# Patient Record
Sex: MALE | Race: BLACK OR AFRICAN AMERICAN | NOT HISPANIC OR LATINO | Employment: UNEMPLOYED | URBAN - METROPOLITAN AREA
[De-identification: names, ages, dates, MRNs, and addresses within clinical notes are randomized per-mention and may not be internally consistent; named-entity substitution may affect disease eponyms.]

---

## 2018-01-06 ENCOUNTER — HOSPITAL ENCOUNTER (EMERGENCY)
Facility: OTHER | Age: 28
Discharge: HOME | End: 2018-01-06
Attending: EMERGENCY MEDICINE
Payer: MEDICAID

## 2018-01-06 VITALS
TEMPERATURE: 97 F | HEIGHT: 69 IN | RESPIRATION RATE: 18 BRPM | WEIGHT: 220 LBS | SYSTOLIC BLOOD PRESSURE: 131 MMHG | BODY MASS INDEX: 32.58 KG/M2 | DIASTOLIC BLOOD PRESSURE: 77 MMHG | HEART RATE: 68 BPM | OXYGEN SATURATION: 99 %

## 2018-01-06 DIAGNOSIS — M54.50 ACUTE BILATERAL LOW BACK PAIN WITHOUT SCIATICA: Primary | ICD-10-CM

## 2018-01-06 PROCEDURE — 99283 EMERGENCY DEPT VISIT LOW MDM: CPT

## 2018-01-06 RX ORDER — IBUPROFEN 600 MG/1
600 TABLET ORAL EVERY 6 HOURS PRN
Qty: 20 TABLET | Refills: 0 | Status: SHIPPED | OUTPATIENT
Start: 2018-01-06 | End: 2020-01-31 | Stop reason: ALTCHOICE

## 2018-01-06 RX ORDER — METHOCARBAMOL 500 MG/1
1000 TABLET, FILM COATED ORAL 3 TIMES DAILY PRN
Qty: 30 TABLET | Refills: 0 | Status: SHIPPED | OUTPATIENT
Start: 2018-01-06 | End: 2018-01-11

## 2018-01-06 RX ORDER — IBUPROFEN 400 MG/1
800 TABLET ORAL
Status: DISCONTINUED | OUTPATIENT
Start: 2018-01-06 | End: 2018-01-06 | Stop reason: HOSPADM

## 2018-01-06 NOTE — ED PROVIDER NOTES
"Encounter Date: 1/6/2018    SCRIBE #1 NOTE: I, Flo Osorio, am scribing for, and in the presence of, Dr. Lozoya.       History     Chief Complaint   Patient presents with    Back Pain     pt c/o 8/10 "thumping" back pain since apx 1400 yesterday, pt denies taking any OTC medications to alleviate symptoms.      3:56 AM    Patient is a 27 y.o. male who presents to the ED with complaint of lower back pain. He reports gradual onset of pain approximately two hours ago. He indicates that the pain is located in the middle of the low back. Pain is intermittent, described as "sharp," and is rated 6/10 currently and 8/10 at its worst. He notes the pain is improved with certain positional changes. 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 He denies taking any OTC medication for current symptoms. She denies experiencing similar pain in the past. He denies bowel or bladder incontinence, nausea, vomiting, diarrhea, constipation, fever, chills, cough, congestion, sore throat, or runny nose. He reports no major medical problems, daily medications, known allergies, or past surgeries. He denies use of tobacco, alcohol, or illicit drugs. He has no additional complaints.      The history is provided by the patient.     Review of patient's allergies indicates:  No Known Allergies  History reviewed. No pertinent past medical history.  History reviewed. No pertinent surgical history.  History reviewed. No pertinent family history.  Social History   Substance Use Topics    Smoking status: Never Smoker    Smokeless tobacco: Never Used    Alcohol use No     Review of Systems   Constitutional: Negative for chills and fever.   HENT: Negative for congestion and sore throat.    Eyes: Negative for visual disturbance.   Respiratory: Negative for cough and shortness of breath.    Cardiovascular: Negative for chest pain and palpitations.   Gastrointestinal: Negative for abdominal pain, diarrhea and " vomiting.        Negative for bowel incontinence.   Genitourinary: Negative for decreased urine volume, dysuria and frequency.        Negative for bladder incontinence.   Musculoskeletal: Positive for back pain (mid-lower back). Negative for joint swelling, neck pain and neck stiffness.   Skin: Negative for rash and wound.   Neurological: Negative for weakness, numbness and headaches.   Psychiatric/Behavioral: Negative for behavioral problems and confusion.       Physical Exam     Initial Vitals [01/06/18 0234]   BP Pulse Resp Temp SpO2   119/64 72 18 97.3 °F (36.3 °C) 96 %      MAP       82.33         Physical Exam    Nursing note and vitals reviewed.  Constitutional: He appears well-developed and well-nourished. No distress.   HENT:   Head: Normocephalic and atraumatic.   Eyes: Conjunctivae and EOM are normal.   Neck: Normal range of motion. Neck supple.   Cardiovascular: Normal rate, regular rhythm and normal heart sounds. Exam reveals no gallop and no friction rub.    No murmur heard.  Pulmonary/Chest: Breath sounds normal. No respiratory distress. He has no wheezes. He has no rhonchi. He has no rales.   Abdominal: Soft. There is no tenderness.   Musculoskeletal: Normal range of motion. He exhibits tenderness (midline tenderness of lower lumbar spine and sacrum with bilateral lumbar paraspinal tenderness to palpation).   Neurological: He is alert and oriented to person, place, and time. He has normal strength. No cranial nerve deficit or sensory deficit.   Skin: Skin is warm and dry.   Psychiatric: He has a normal mood and affect. His behavior is normal.         ED Course   Procedures  Labs Reviewed - No data to display          Medical Decision Making:   ED Management:  Emergent evaluation of 27-year-old male with complaint of low back pain, no direct trauma.  On exam there is no evidence of cauda equina syndrome or cord compression.  I do not think emergent imaging is indicated given lack of neuro symptoms or  trauma.  He was treated with ibuprofen and is discharged in good condition with prescriptions for ibuprofen and Robaxin.  He is advised close follow-up with PCP or return here for any new or worsening symptoms.            Scribe Attestation:   Scribe #1: I performed the above scribed service and the documentation accurately describes the services I performed. I attest to the accuracy of the note.    Attending Attestation:           Physician Attestation for Scribe:  Physician Attestation Statement for Scribe #1: I, Dr. Lozoya, reviewed documentation, as scribed by Flo Osorio in my presence, and it is both accurate and complete.                 ED Course      Clinical Impression:     1. Acute bilateral low back pain without sciatica                                 Taylor Lozoya MD  01/06/18 0612

## 2018-01-06 NOTE — ED NOTES
"Pt presents to the ED with c/o back pain. Pt describes the pain as "thumping" in nature. Pt reports the pain began around 2pm when he was leaving work. Pt denies any injury. Pt reports the pain is from the middle to lower back. Pt denies taking anything to attempt to alleviate the pain. Pt appears non toxic and in no signs of acute distress.   "

## 2020-01-31 ENCOUNTER — HOSPITAL ENCOUNTER (EMERGENCY)
Facility: OTHER | Age: 30
Discharge: HOME | End: 2020-01-31
Attending: EMERGENCY MEDICINE
Payer: MEDICAID

## 2020-01-31 VITALS
HEIGHT: 69 IN | OXYGEN SATURATION: 100 % | RESPIRATION RATE: 18 BRPM | WEIGHT: 210 LBS | HEART RATE: 84 BPM | SYSTOLIC BLOOD PRESSURE: 118 MMHG | DIASTOLIC BLOOD PRESSURE: 74 MMHG | TEMPERATURE: 98 F | BODY MASS INDEX: 31.1 KG/M2

## 2020-01-31 DIAGNOSIS — N34.2 URETHRITIS: Primary | ICD-10-CM

## 2020-01-31 LAB
BACTERIA #/AREA URNS HPF: ABNORMAL /HPF
BILIRUB UR QL STRIP: NEGATIVE
CLARITY UR: CLEAR
COLOR UR: YELLOW
GLUCOSE UR QL STRIP: NEGATIVE
HGB UR QL STRIP: NEGATIVE
HYALINE CASTS #/AREA URNS LPF: 0 /LPF
KETONES UR QL STRIP: NEGATIVE
LEUKOCYTE ESTERASE UR QL STRIP: ABNORMAL
MICROSCOPIC COMMENT: ABNORMAL
NITRITE UR QL STRIP: NEGATIVE
NON-SQ EPI CELLS #/AREA URNS HPF: 0 /HPF
PH UR STRIP: 6 [PH] (ref 5–8)
PROT UR QL STRIP: NEGATIVE
RBC #/AREA URNS HPF: 0 /HPF (ref 0–4)
SP GR UR STRIP: 1.02 (ref 1–1.03)
SQUAMOUS #/AREA URNS HPF: 2 /HPF
URN SPEC COLLECT METH UR: ABNORMAL
UROBILINOGEN UR STRIP-ACNC: NEGATIVE EU/DL
WBC #/AREA URNS HPF: 20 /HPF (ref 0–5)
WBC CLUMPS URNS QL MICRO: ABNORMAL
YEAST URNS QL MICRO: ABNORMAL

## 2020-01-31 PROCEDURE — 99284 EMERGENCY DEPT VISIT MOD MDM: CPT | Mod: 25

## 2020-01-31 PROCEDURE — 81000 URINALYSIS NONAUTO W/SCOPE: CPT

## 2020-01-31 PROCEDURE — 96372 THER/PROPH/DIAG INJ SC/IM: CPT

## 2020-01-31 PROCEDURE — 87086 URINE CULTURE/COLONY COUNT: CPT

## 2020-01-31 PROCEDURE — 87491 CHLMYD TRACH DNA AMP PROBE: CPT

## 2020-01-31 PROCEDURE — 63600175 PHARM REV CODE 636 W HCPCS: Performed by: EMERGENCY MEDICINE

## 2020-01-31 PROCEDURE — 25000003 PHARM REV CODE 250: Performed by: EMERGENCY MEDICINE

## 2020-01-31 RX ORDER — CEFTRIAXONE 250 MG/1
250 INJECTION, POWDER, FOR SOLUTION INTRAMUSCULAR; INTRAVENOUS
Status: COMPLETED | OUTPATIENT
Start: 2020-01-31 | End: 2020-01-31

## 2020-01-31 RX ORDER — AZITHROMYCIN 250 MG/1
1000 TABLET, FILM COATED ORAL
Status: COMPLETED | OUTPATIENT
Start: 2020-01-31 | End: 2020-01-31

## 2020-01-31 RX ADMIN — AZITHROMYCIN 1000 MG: 250 TABLET, FILM COATED ORAL at 10:01

## 2020-01-31 RX ADMIN — CEFTRIAXONE SODIUM 250 MG: 250 INJECTION, POWDER, FOR SOLUTION INTRAMUSCULAR; INTRAVENOUS at 10:01

## 2020-01-31 NOTE — ED PROVIDER NOTES
Encounter Date: 1/31/2020    SCRIBE #1 NOTE: IMilad, am scribing for, and in the presence of, Dr. Evans.       History     Chief Complaint   Patient presents with    Dysuria     Pt c/o penile discharge & burning upon urination which started yesterday. Pt reports unprotected intercourse.    Penile Discharge     Time seen by provider: 10:36 AM    This is a 29 y.o. male who presents with complaint of penile discharge that began yesterday. The patient reports that he is also experiencing dysuria. He admits to being sexually active with one person for a few years with intermittent use of protection. He denies fever, sore throat, chest pain, shortness of breath, nausea, vomiting, and penile lesions.     The history is provided by the patient.     Review of patient's allergies indicates:  No Known Allergies  History reviewed. No pertinent past medical history.  History reviewed. No pertinent surgical history.  History reviewed. No pertinent family history.  Social History     Tobacco Use    Smoking status: Current Every Day Smoker    Smokeless tobacco: Never Used    Tobacco comment: pt smokes  marijuana daily   Substance Use Topics    Alcohol use: Yes     Comment: rarely    Drug use: Yes     Types: Marijuana     Review of Systems   Constitutional: Negative for fever.   HENT: Negative for sore throat.    Eyes: Negative for redness.   Respiratory: Negative for shortness of breath.    Cardiovascular: Negative for chest pain.   Gastrointestinal: Negative for abdominal pain.   Genitourinary: Positive for discharge and dysuria.        Negative for penile lesions.    Skin: Negative for rash.   Neurological: Negative for headaches.   Psychiatric/Behavioral: Negative for confusion.       Physical Exam     Initial Vitals [01/31/20 0917]   BP Pulse Resp Temp SpO2   119/72 86 18 97.8 °F (36.6 °C) 100 %      MAP       --         Physical Exam    Nursing note and vitals reviewed.  Constitutional: He appears  well-developed and well-nourished. He is not diaphoretic. No distress.   HENT:   Head: Normocephalic and atraumatic.   Mouth/Throat: Oropharynx is clear and moist.   Eyes: Conjunctivae and EOM are normal. Pupils are equal, round, and reactive to light.   Neck: Normal range of motion. Neck supple.   Cardiovascular: Normal rate, regular rhythm, normal heart sounds and normal pulses.   No murmur heard.  Pulmonary/Chest: Breath sounds normal. No respiratory distress. He has no wheezes. He has no rhonchi. He has no rales.   Abdominal: Soft. There is no tenderness. There is no rebound and no guarding.   Genitourinary: Testes normal and penis normal. Right testis shows no tenderness. Left testis shows no tenderness. No discharge found.   Genitourinary Comments: No rashes.    Musculoskeletal: Normal range of motion. He exhibits no edema or tenderness.   Neurological: He is alert and oriented to person, place, and time. He has normal strength. No cranial nerve deficit.   Skin: Skin is warm and dry.   Psychiatric: He has a normal mood and affect. His behavior is normal. Thought content normal.         ED Course   Procedures  Labs Reviewed   C. TRACHOMATIS/N. GONORRHOEAE BY AMP DNA - Abnormal; Notable for the following components:       Result Value    N gonorrhoeae, amplified DNA Detected (*)     All other components within normal limits    Narrative:     Sources by Resulting Lab:->[facility]   URINALYSIS, REFLEX TO URINE CULTURE - Abnormal; Notable for the following components:    Leukocytes, UA Trace (*)     All other components within normal limits    Narrative:     Preferred Collection Type->Urine, Clean Catch   URINALYSIS MICROSCOPIC - Abnormal; Notable for the following components:    WBC, UA 20 (*)     Bacteria Few (*)     All other components within normal limits    Narrative:     Preferred Collection Type->Urine, Clean Catch   CULTURE, URINE    Narrative:     Preferred Collection Type->Urine, Clean Catch           Imaging Results    None          Medical Decision Making:   History:   Old Medical Records: I decided to obtain old medical records.  Initial Assessment:       29-year-old male presents with penis discharge since yesterday.  He reports using protection occasionally with 1 partner.  No other associated symptoms such as testicle pain, rash, or other new complaints.   Exam with no concerning findings, no evidence of rash or ulcers to suggest HSV or syphilis, and no testicular tenderness to suggest epididymitis/orchitis.  UA with 20 WBC; given patient's age and risk factors, this is much more likely from STD than UTI.   Patient treated empirically for GC/chlamydia with ceftriaxone/Zithromax; we will call him for any positive results of these cultures and he will follow up with PCP or [facility] for full testing.  He can return to the ED for any worsening symptoms or other concerns.       Clinical Tests:   Lab Tests: Ordered and Reviewed            Scribe Attestation:   Scribe #1: I performed the above scribed service and the documentation accurately describes the services I performed. I attest to the accuracy of the note.    Attending Attestation:           Physician Attestation for Scribe:  Physician Attestation Statement for Scribe #1: I, Dr. Evans, reviewed documentation, as scribed by Milad Hurley  in my presence, and it is both accurate and complete.                               Clinical Impression:     1. Urethritis                              Jamie Evans MD  02/03/20 6137

## 2020-01-31 NOTE — ED NOTES
Patient Identifiers for Anirudh Stinson checked and correct  LOC: The patient is awake, alert and aware of environment with an appropriate affect, the patient is oriented x 3 and speaking appropriate.  APPEARANCE: Patient resting comfortably and in no acute distress. The patient is clean and well groomed. The patient's clothing is properly fastened.  SKIN: The skin is warm and dry. The patient has normal skin turgor and moist mucus membranes. No rashes or lesions upon observation. Skin Intact , no breakdown noted.  Musculoskeletal :  Normal range of motion noted. Moves all extremities well.  RESPIRATORY: Airway is open and patent, respirations are spontaneous, patient has a normal effort and rate.   ABDOMEN: Soft and non tender to palpation, no distention observed. Bowels Sounds are WNL all quads.  PULSES: 2+ radial pulses, symmetrical.  Will continue to monitor

## 2020-02-02 LAB — BACTERIA UR CULT: NO GROWTH

## 2020-02-03 LAB
C TRACH DNA SPEC QL NAA+PROBE: NOT DETECTED
N GONORRHOEA DNA SPEC QL NAA+PROBE: DETECTED

## 2022-07-02 ENCOUNTER — HOSPITAL ENCOUNTER (EMERGENCY)
Facility: HOSPITAL | Age: 32
Discharge: HOME | End: 2022-07-02
Attending: EMERGENCY MEDICINE
Payer: MEDICAID

## 2022-07-02 VITALS
TEMPERATURE: 99 F | WEIGHT: 200 LBS | OXYGEN SATURATION: 99 % | DIASTOLIC BLOOD PRESSURE: 89 MMHG | BODY MASS INDEX: 29.53 KG/M2 | HEART RATE: 68 BPM | SYSTOLIC BLOOD PRESSURE: 127 MMHG | RESPIRATION RATE: 16 BRPM

## 2022-07-02 DIAGNOSIS — M79.10 MUSCULAR PAIN: Primary | ICD-10-CM

## 2022-07-02 DIAGNOSIS — R07.9 CHEST PAIN: ICD-10-CM

## 2022-07-02 LAB
ALBUMIN SERPL BCP-MCNC: 4 G/DL (ref 3.5–5.2)
ALP SERPL-CCNC: 68 U/L (ref 55–135)
ALT SERPL W/O P-5'-P-CCNC: 11 U/L (ref 10–44)
ANION GAP SERPL CALC-SCNC: 8 MMOL/L (ref 8–16)
AST SERPL-CCNC: 11 U/L (ref 10–40)
BASOPHILS # BLD AUTO: 0.05 K/UL (ref 0–0.2)
BASOPHILS NFR BLD: 0.6 % (ref 0–1.9)
BILIRUB SERPL-MCNC: 0.3 MG/DL (ref 0.1–1)
BILIRUB UR QL STRIP: NEGATIVE
BUN SERPL-MCNC: 8 MG/DL (ref 6–20)
CALCIUM SERPL-MCNC: 9 MG/DL (ref 8.7–10.5)
CHLORIDE SERPL-SCNC: 109 MMOL/L (ref 95–110)
CLARITY UR: CLEAR
CO2 SERPL-SCNC: 25 MMOL/L (ref 23–29)
COLOR UR: YELLOW
CREAT SERPL-MCNC: 1.1 MG/DL (ref 0.5–1.4)
DIFFERENTIAL METHOD: NORMAL
EOSINOPHIL # BLD AUTO: 0.1 K/UL (ref 0–0.5)
EOSINOPHIL NFR BLD: 1.4 % (ref 0–8)
ERYTHROCYTE [DISTWIDTH] IN BLOOD BY AUTOMATED COUNT: 13.4 % (ref 11.5–14.5)
EST. GFR  (AFRICAN AMERICAN): >60 ML/MIN/1.73 M^2
EST. GFR  (NON AFRICAN AMERICAN): >60 ML/MIN/1.73 M^2
GLUCOSE SERPL-MCNC: 85 MG/DL (ref 70–110)
GLUCOSE UR QL STRIP: NEGATIVE
HCT VFR BLD AUTO: 43.5 % (ref 40–54)
HGB BLD-MCNC: 14.3 G/DL (ref 14–18)
HGB UR QL STRIP: NEGATIVE
IMM GRANULOCYTES # BLD AUTO: 0.01 K/UL (ref 0–0.04)
IMM GRANULOCYTES NFR BLD AUTO: 0.1 % (ref 0–0.5)
KETONES UR QL STRIP: NEGATIVE
LEUKOCYTE ESTERASE UR QL STRIP: NEGATIVE
LYMPHOCYTES # BLD AUTO: 2.7 K/UL (ref 1–4.8)
LYMPHOCYTES NFR BLD: 32.4 % (ref 18–48)
MCH RBC QN AUTO: 30.2 PG (ref 27–31)
MCHC RBC AUTO-ENTMCNC: 32.9 G/DL (ref 32–36)
MCV RBC AUTO: 92 FL (ref 82–98)
MONOCYTES # BLD AUTO: 0.7 K/UL (ref 0.3–1)
MONOCYTES NFR BLD: 8.3 % (ref 4–15)
NEUTROPHILS # BLD AUTO: 4.8 K/UL (ref 1.8–7.7)
NEUTROPHILS NFR BLD: 57.2 % (ref 38–73)
NITRITE UR QL STRIP: NEGATIVE
NRBC BLD-RTO: 0 /100 WBC
PH UR STRIP: 8 [PH] (ref 5–8)
PLATELET # BLD AUTO: 181 K/UL (ref 150–450)
PMV BLD AUTO: 10.9 FL (ref 9.2–12.9)
POTASSIUM SERPL-SCNC: 4 MMOL/L (ref 3.5–5.1)
PROT SERPL-MCNC: 7.2 G/DL (ref 6–8.4)
PROT UR QL STRIP: NEGATIVE
RBC # BLD AUTO: 4.73 M/UL (ref 4.6–6.2)
SODIUM SERPL-SCNC: 142 MMOL/L (ref 136–145)
SP GR UR STRIP: 1.02 (ref 1–1.03)
TROPONIN I SERPL DL<=0.01 NG/ML-MCNC: 0.01 NG/ML (ref 0–0.03)
URN SPEC COLLECT METH UR: NORMAL
UROBILINOGEN UR STRIP-ACNC: NEGATIVE EU/DL
WBC # BLD AUTO: 8.4 K/UL (ref 3.9–12.7)

## 2022-07-02 PROCEDURE — 63600175 PHARM REV CODE 636 W HCPCS: Performed by: NURSE PRACTITIONER

## 2022-07-02 PROCEDURE — 93005 ELECTROCARDIOGRAM TRACING: CPT

## 2022-07-02 PROCEDURE — 84484 ASSAY OF TROPONIN QUANT: CPT | Performed by: NURSE PRACTITIONER

## 2022-07-02 PROCEDURE — 81003 URINALYSIS AUTO W/O SCOPE: CPT | Performed by: NURSE PRACTITIONER

## 2022-07-02 PROCEDURE — 96374 THER/PROPH/DIAG INJ IV PUSH: CPT

## 2022-07-02 PROCEDURE — 93010 EKG 12-LEAD: ICD-10-PCS | Mod: ,,, | Performed by: INTERNAL MEDICINE

## 2022-07-02 PROCEDURE — 80053 COMPREHEN METABOLIC PANEL: CPT | Performed by: NURSE PRACTITIONER

## 2022-07-02 PROCEDURE — 93010 ELECTROCARDIOGRAM REPORT: CPT | Mod: ,,, | Performed by: INTERNAL MEDICINE

## 2022-07-02 PROCEDURE — 85025 COMPLETE CBC W/AUTO DIFF WBC: CPT | Performed by: NURSE PRACTITIONER

## 2022-07-02 PROCEDURE — 99285 EMERGENCY DEPT VISIT HI MDM: CPT | Mod: 25

## 2022-07-02 RX ORDER — IBUPROFEN 400 MG/1
400 TABLET ORAL EVERY 6 HOURS PRN
Qty: 20 TABLET | Refills: 0 | Status: SHIPPED | OUTPATIENT
Start: 2022-07-02

## 2022-07-02 RX ORDER — KETOROLAC TROMETHAMINE 30 MG/ML
15 INJECTION, SOLUTION INTRAMUSCULAR; INTRAVENOUS
Status: COMPLETED | OUTPATIENT
Start: 2022-07-02 | End: 2022-07-02

## 2022-07-02 RX ADMIN — KETOROLAC TROMETHAMINE 15 MG: 30 INJECTION, SOLUTION INTRAMUSCULAR; INTRAVENOUS at 01:07

## 2022-07-02 NOTE — DISCHARGE INSTRUCTIONS

## 2022-07-02 NOTE — ED PROVIDER NOTES
Encounter Date: 7/2/2022    SCRIBE #1 NOTE: I, Jeanne Krishna, am scribing for, and in the presence of, Carmen Breen NP.       History     Chief Complaint   Patient presents with    Chest Pain     Pt presents to ED today c/o mid upper chest pain and difficulty swallowing x 3 days onset while at rest. Airway patent. Pt denies taking medication for pain      Anirudh Stinson is a 32 y.o. male who  has no past medical history on file.    The patient presents to the ED for evaluation of 3-day history mid-sternal chest pain and upper back pain. Patient reports mid-sternal chest pain that is exacerbated with certain movements and when he talks/swallows. Other associated symptoms include upper back pain and cough. Patient reports he also noticed a knot in his left calf with associated pain last week but states this is resolved. He has not taken any medications for his symptoms. Patient denies any hemoptysis or leg swelling. He denies increased stress/anxiety in his life lately. He denies any cardiac history or history of blood clots. He denies hormone usage. He denies any recent traveling or sitting in plane/car for long period of time. Denies recent illness or sick contacts (mother had Covid 3 weeks ago)    The history is provided by the patient. No  was used.     Review of patient's allergies indicates:  No Known Allergies  No past medical history on file.  No past surgical history on file.  No family history on file.  Social History     Tobacco Use    Smoking status: Current Every Day Smoker    Smokeless tobacco: Never Used    Tobacco comment: pt smokes  marijuana daily   Substance Use Topics    Alcohol use: Yes     Comment: rarely    Drug use: Yes     Types: Marijuana     Review of Systems   Constitutional: Negative for chills and fever.   HENT: Positive for trouble swallowing. Negative for sore throat.    Eyes: Negative for redness.   Respiratory: Positive for cough.    Cardiovascular:  Positive for chest pain. Negative for leg swelling.   Gastrointestinal: Negative for abdominal pain, diarrhea, nausea and vomiting.   Genitourinary: Negative for dysuria and hematuria.   Musculoskeletal: Positive for back pain.   Skin: Negative for rash.   Neurological: Negative for headaches.       Physical Exam     Initial Vitals [07/02/22 1301]   BP Pulse Resp Temp SpO2   124/80 (!) 58 16 98.7 °F (37.1 °C) 98 %      MAP       --         Physical Exam    Nursing note and vitals reviewed.  Constitutional: He appears well-developed and well-nourished. He is not diaphoretic. No distress.   HENT:   Head: Normocephalic and atraumatic.   Nose: Nose normal.   Mouth/Throat: Uvula is midline, oropharynx is clear and moist and mucous membranes are normal.   Eyes: Lids are normal. Pupils are equal, round, and reactive to light.   Cardiovascular: Normal rate.   Pulmonary/Chest: Effort normal and breath sounds normal. No respiratory distress. He exhibits tenderness (mild with palpation).   Abdominal: Abdomen is soft. There is no abdominal tenderness.   Musculoskeletal:      Comments: Reproducible trapezius muscle tenderness.      Neurological: He is oriented to person, place, and time.   Skin: Skin is warm and dry. No rash noted.   Psychiatric: He has a normal mood and affect. His behavior is normal. Judgment and thought content normal.         ED Course   Procedures  Labs Reviewed   CBC W/ AUTO DIFFERENTIAL   COMPREHENSIVE METABOLIC PANEL   TROPONIN I   URINALYSIS, REFLEX TO URINE CULTURE    Narrative:     Specimen Source->Urine          Imaging Results          X-Ray Chest 1 View (Final result)  Result time 07/02/22 14:08:10    Final result by Corey Oropeza DO (07/02/22 14:08:10)                 Impression:      Please see above      Electronically signed by: Corey Oropeza DO  Date:    07/02/2022  Time:    14:08             Narrative:    EXAMINATION:  XR CHEST 1 VIEW    CLINICAL HISTORY:  Chest pain,  unspecified    TECHNIQUE:  Single frontal view of the chest was performed.    COMPARISON:  None    FINDINGS:  No lung consolidation.  External monitoring leads overlie the thorax.  There is no large pleural effusion or pneumothorax with slight limitation by partial exclusion costophrenic angles.  No definite central pulmonary vascular congestion allowing for limitation by portable technique.  Further evaluation as warranted clinically.                                 Medications   ketorolac injection 15 mg (15 mg Intravenous Given 7/2/22 1342)     Medical Decision Making:   Initial Assessment:   32-year-old male presents to the ED for evaluation of 3-day history mid-sternal chest pain and upper back pain. Physical exam is noted for reproducible trapezius muscle tenderness and mild chest wall tenderness with palpation.   Clinical Tests:   Lab Tests: Ordered and Reviewed  Radiological Study: Ordered and Reviewed  Medical Tests: Ordered and Reviewed    Additional MDM:   PERC Rule:   Age is greater than or equal to 50 = 0.0  Heart Rate is greater than or equal to 100 = 0.0  SaO2 on room air < 95% = 0.0  Unilateral leg swelling = 0.0  Hemoptysis = 0.0  Recent surgery or trauma = 0.0  Prior PE or DVT =  0.0  Hormone use = 0.00  PERC Score = 0  Heart Score:    History:          Slightly suspicious.  ECG:             Normal  Age:               Less than 45 years  Risk factors: no risk factors known  Troponin:       Less than or equal to normal limit  Final Score: 0           Scribe Attestation:   Scribe #1: I performed the above scribed service and the documentation accurately describes the services I performed. I attest to the accuracy of the note.        ED Course as of 07/02/22 1510   Sat Jul 02, 2022   1501 Pt reports much improvement in his condition after the Toradol was given. Close follow up with pcp and motrin recommended. Strict return precautions. Pt is not toxic in appearance, playing on cell phone and is in  nad.      [DT]   1505 EKG with rate of 58, SB with no stemi noted. Signed per Dr Abdullahi.  [DT]      ED Course User Index  [DT] Carmen Breen NP             Clinical Impression:   Final diagnoses:  [R07.9] Chest pain  [M79.10] Muscular pain (Primary)          ED Disposition Condition    Discharge Stable        ED Prescriptions     Medication Sig Dispense Start Date End Date Auth. Provider    ibuprofen (ADVIL,MOTRIN) 400 MG tablet Take 1 tablet (400 mg total) by mouth every 6 (six) hours as needed for Other (pain). 20 tablet 7/2/2022  Carmen Breen NP        Follow-up Information     Follow up With Specialties Details Why Contact Info Additional Information    [facility] Family Medicine Schedule an appointment as soon as possible for a visit in 2 days  [address], [address]  [place] [id number] [phone] Please park in [address] or [address] and use [address]. Take Medical Office Bldg. elevators.         I, Carmen Breen NP, personally performed the services described in this documentation.All medical record entries made by the scribe were at my direction and in my presence.I have reviewed the chart and agree that the record reflects my personal performance and is accurate and complete.        Carmen Breen NP  07/02/22 1505       Carmen Breen NP  07/02/22 1505       Carmen Breen NP  07/02/22 1510

## 2022-07-08 ENCOUNTER — OFFICE VISIT (OUTPATIENT)
Dept: URGENT CARE | Facility: CLINIC | Age: 32
End: 2022-07-08
Payer: MEDICAID

## 2022-07-08 VITALS
OXYGEN SATURATION: 98 % | HEIGHT: 69 IN | DIASTOLIC BLOOD PRESSURE: 71 MMHG | SYSTOLIC BLOOD PRESSURE: 114 MMHG | HEART RATE: 95 BPM | WEIGHT: 200 LBS | TEMPERATURE: 99 F | BODY MASS INDEX: 29.62 KG/M2 | RESPIRATION RATE: 18 BRPM

## 2022-07-08 DIAGNOSIS — U07.1 COVID: ICD-10-CM

## 2022-07-08 DIAGNOSIS — R51.9 NONINTRACTABLE HEADACHE, UNSPECIFIED CHRONICITY PATTERN, UNSPECIFIED HEADACHE TYPE: Primary | ICD-10-CM

## 2022-07-08 LAB
CTP QC/QA: YES
SARS-COV-2 RDRP RESP QL NAA+PROBE: POSITIVE

## 2022-07-08 PROCEDURE — 3008F BODY MASS INDEX DOCD: CPT | Mod: CPTII,S$GLB,, | Performed by: PHYSICIAN ASSISTANT

## 2022-07-08 PROCEDURE — 3074F PR MOST RECENT SYSTOLIC BLOOD PRESSURE < 130 MM HG: ICD-10-PCS | Mod: CPTII,S$GLB,, | Performed by: PHYSICIAN ASSISTANT

## 2022-07-08 PROCEDURE — 3008F PR BODY MASS INDEX (BMI) DOCUMENTED: ICD-10-PCS | Mod: CPTII,S$GLB,, | Performed by: PHYSICIAN ASSISTANT

## 2022-07-08 PROCEDURE — 3078F DIAST BP <80 MM HG: CPT | Mod: CPTII,S$GLB,, | Performed by: PHYSICIAN ASSISTANT

## 2022-07-08 PROCEDURE — 1159F PR MEDICATION LIST DOCUMENTED IN MEDICAL RECORD: ICD-10-PCS | Mod: CPTII,S$GLB,, | Performed by: PHYSICIAN ASSISTANT

## 2022-07-08 PROCEDURE — U0002: ICD-10-PCS | Mod: QW,S$GLB,, | Performed by: PHYSICIAN ASSISTANT

## 2022-07-08 PROCEDURE — 99203 OFFICE O/P NEW LOW 30 MIN: CPT | Mod: S$GLB,,, | Performed by: PHYSICIAN ASSISTANT

## 2022-07-08 PROCEDURE — 3074F SYST BP LT 130 MM HG: CPT | Mod: CPTII,S$GLB,, | Performed by: PHYSICIAN ASSISTANT

## 2022-07-08 PROCEDURE — 1160F RVW MEDS BY RX/DR IN RCRD: CPT | Mod: CPTII,S$GLB,, | Performed by: PHYSICIAN ASSISTANT

## 2022-07-08 PROCEDURE — 3078F PR MOST RECENT DIASTOLIC BLOOD PRESSURE < 80 MM HG: ICD-10-PCS | Mod: CPTII,S$GLB,, | Performed by: PHYSICIAN ASSISTANT

## 2022-07-08 PROCEDURE — 1160F PR REVIEW ALL MEDS BY PRESCRIBER/CLIN PHARMACIST DOCUMENTED: ICD-10-PCS | Mod: CPTII,S$GLB,, | Performed by: PHYSICIAN ASSISTANT

## 2022-07-08 PROCEDURE — 99203 PR OFFICE/OUTPT VISIT, NEW, LEVL III, 30-44 MIN: ICD-10-PCS | Mod: S$GLB,,, | Performed by: PHYSICIAN ASSISTANT

## 2022-07-08 PROCEDURE — U0002 COVID-19 LAB TEST NON-CDC: HCPCS | Mod: QW,S$GLB,, | Performed by: PHYSICIAN ASSISTANT

## 2022-07-08 PROCEDURE — 1159F MED LIST DOCD IN RCRD: CPT | Mod: CPTII,S$GLB,, | Performed by: PHYSICIAN ASSISTANT

## 2022-07-08 NOTE — PROGRESS NOTES
"Subjective:       Patient ID: Anirudh Stinson is a 32 y.o. male.    Vitals:  height is 5' 9" (1.753 m) and weight is 90.7 kg (200 lb). His temperature is 98.6 °F (37 °C). His blood pressure is 114/71 and his pulse is 95. His respiration is 18 and oxygen saturation is 98%.     Chief Complaint: URI    Pt presents to urgent care for fatigue, bodyaches, chills, coughing, sneezing since this morning.  No medication taken    URI   This is a new problem. The current episode started today. Associated symptoms include coughing, headaches, sneezing and a sore throat. He has tried acetaminophen for the symptoms.       Constitution: Positive for fatigue.   HENT: Positive for sore throat.    Respiratory: Positive for cough.    Musculoskeletal: Positive for muscle ache.   Skin: Negative for erythema.   Allergic/Immunologic: Positive for sneezing.   Neurological: Positive for headaches.       Objective:      Physical Exam   Constitutional: He is oriented to person, place, and time. He appears well-developed. He is cooperative.  Non-toxic appearance. No distress.   HENT:   Head: Normocephalic and atraumatic.   Ears:   Right Ear: Hearing, tympanic membrane, external ear and ear canal normal.   Left Ear: Hearing, tympanic membrane, external ear and ear canal normal.   Nose: Nose normal. No mucosal edema, rhinorrhea, nasal deformity or congestion. No epistaxis. Right sinus exhibits no maxillary sinus tenderness and no frontal sinus tenderness. Left sinus exhibits no maxillary sinus tenderness and no frontal sinus tenderness.   Mouth/Throat: Uvula is midline, oropharynx is clear and moist and mucous membranes are normal. No trismus in the jaw. Normal dentition. No uvula swelling. No oropharyngeal exudate, posterior oropharyngeal edema or posterior oropharyngeal erythema.   Eyes: Conjunctivae, EOM and lids are normal. Pupils are equal, round, and reactive to light. Right eye exhibits no discharge. Left eye exhibits no discharge. No " scleral icterus.   Neck: Trachea normal and phonation normal. Neck supple. No JVD present. No tracheal deviation present. No thyromegaly present. No edema present. No erythema present. No neck rigidity present.   Cardiovascular: Normal rate, regular rhythm, normal heart sounds and normal pulses.   No murmur heard.Exam reveals no gallop and no friction rub.   Pulmonary/Chest: Effort normal and breath sounds normal. No stridor. No respiratory distress. He has no decreased breath sounds. He has no wheezes. He has no rhonchi. He has no rales. He exhibits no tenderness.   Abdominal: Normal appearance. He exhibits no distension. Soft. There is no abdominal tenderness. There is no rebound, no guarding, no left CVA tenderness and no right CVA tenderness.   Musculoskeletal: Normal range of motion.         General: No deformity. Normal range of motion.      Cervical back: He exhibits no tenderness.   Lymphadenopathy:     He has no cervical adenopathy.   Neurological: no focal deficit. He is alert and oriented to person, place, and time. He exhibits normal muscle tone. Coordination normal.   Skin: Skin is warm, dry, intact, not diaphoretic, not pale and no rash. Capillary refill takes less than 2 seconds. No bruising, No erythema and No lesion jaundice  Psychiatric: His speech is normal and behavior is normal. Judgment and thought content normal.   Nursing note and vitals reviewed.        Assessment:       1. Nonintractable headache, unspecified chronicity pattern, unspecified headache type    2. COVID          Plan:         Nonintractable headache, unspecified chronicity pattern, unspecified headache type  -     POCT COVID-19 Rapid Screening    COVID  -     nirmatrelvir-ritonavir 150 mg x 2- 100 mg copackaged tablets (EUA); Take 3 tablets by mouth 2 (two) times daily for 5 days. Each dose contains 2 nirmatrelvir (pink tablets) and 1 ritonavir (white tablet). Take all 3 tablets together  Dispense: 30 tablet; Refill:  0    Follow up if symptoms worsen or fail to improve, for F/U with PCP or ED. There are no Patient Instructions on file for this visit.

## 2022-08-05 ENCOUNTER — OFFICE VISIT (OUTPATIENT)
Dept: URGENT CARE | Facility: CLINIC | Age: 32
End: 2022-08-05
Payer: MEDICAID

## 2022-08-05 VITALS
OXYGEN SATURATION: 98 % | HEART RATE: 68 BPM | HEIGHT: 69 IN | BODY MASS INDEX: 29.62 KG/M2 | WEIGHT: 200 LBS | SYSTOLIC BLOOD PRESSURE: 113 MMHG | TEMPERATURE: 98 F | RESPIRATION RATE: 18 BRPM | DIASTOLIC BLOOD PRESSURE: 79 MMHG

## 2022-08-05 DIAGNOSIS — B34.9 VIRAL SYNDROME: Primary | ICD-10-CM

## 2022-08-05 DIAGNOSIS — R05.9 COUGH: ICD-10-CM

## 2022-08-05 LAB
CTP QC/QA: YES
SARS-COV-2 RDRP RESP QL NAA+PROBE: NEGATIVE

## 2022-08-05 PROCEDURE — 1160F RVW MEDS BY RX/DR IN RCRD: CPT | Mod: CPTII,S$GLB,, | Performed by: PHYSICIAN ASSISTANT

## 2022-08-05 PROCEDURE — 1159F MED LIST DOCD IN RCRD: CPT | Mod: CPTII,S$GLB,, | Performed by: PHYSICIAN ASSISTANT

## 2022-08-05 PROCEDURE — 99213 PR OFFICE/OUTPT VISIT, EST, LEVL III, 20-29 MIN: ICD-10-PCS | Mod: S$GLB,,, | Performed by: PHYSICIAN ASSISTANT

## 2022-08-05 PROCEDURE — 99213 OFFICE O/P EST LOW 20 MIN: CPT | Mod: S$GLB,,, | Performed by: PHYSICIAN ASSISTANT

## 2022-08-05 PROCEDURE — U0002 COVID-19 LAB TEST NON-CDC: HCPCS | Mod: QW,S$GLB,, | Performed by: PHYSICIAN ASSISTANT

## 2022-08-05 PROCEDURE — U0002: ICD-10-PCS | Mod: QW,S$GLB,, | Performed by: PHYSICIAN ASSISTANT

## 2022-08-05 PROCEDURE — 1160F PR REVIEW ALL MEDS BY PRESCRIBER/CLIN PHARMACIST DOCUMENTED: ICD-10-PCS | Mod: CPTII,S$GLB,, | Performed by: PHYSICIAN ASSISTANT

## 2022-08-05 PROCEDURE — 3074F PR MOST RECENT SYSTOLIC BLOOD PRESSURE < 130 MM HG: ICD-10-PCS | Mod: CPTII,S$GLB,, | Performed by: PHYSICIAN ASSISTANT

## 2022-08-05 PROCEDURE — 1159F PR MEDICATION LIST DOCUMENTED IN MEDICAL RECORD: ICD-10-PCS | Mod: CPTII,S$GLB,, | Performed by: PHYSICIAN ASSISTANT

## 2022-08-05 PROCEDURE — 3078F DIAST BP <80 MM HG: CPT | Mod: CPTII,S$GLB,, | Performed by: PHYSICIAN ASSISTANT

## 2022-08-05 PROCEDURE — 3074F SYST BP LT 130 MM HG: CPT | Mod: CPTII,S$GLB,, | Performed by: PHYSICIAN ASSISTANT

## 2022-08-05 PROCEDURE — 3008F BODY MASS INDEX DOCD: CPT | Mod: CPTII,S$GLB,, | Performed by: PHYSICIAN ASSISTANT

## 2022-08-05 PROCEDURE — 3078F PR MOST RECENT DIASTOLIC BLOOD PRESSURE < 80 MM HG: ICD-10-PCS | Mod: CPTII,S$GLB,, | Performed by: PHYSICIAN ASSISTANT

## 2022-08-05 PROCEDURE — 3008F PR BODY MASS INDEX (BMI) DOCUMENTED: ICD-10-PCS | Mod: CPTII,S$GLB,, | Performed by: PHYSICIAN ASSISTANT

## 2022-08-05 RX ORDER — BENZONATATE 200 MG/1
200 CAPSULE ORAL 3 TIMES DAILY PRN
Qty: 30 CAPSULE | Refills: 0 | Status: SHIPPED | OUTPATIENT
Start: 2022-08-05 | End: 2022-08-15

## 2022-08-05 RX ORDER — IBUPROFEN 800 MG/1
800 TABLET ORAL EVERY 6 HOURS PRN
Qty: 30 TABLET | Refills: 0 | Status: SHIPPED | OUTPATIENT
Start: 2022-08-05

## 2022-08-05 NOTE — PROGRESS NOTES
"Subjective:       Patient ID: Anirudh Stinson is a 32 y.o. male.    Vitals:  height is 5' 9" (1.753 m) and weight is 90.7 kg (200 lb). His oral temperature is 97.9 °F (36.6 °C). His blood pressure is 113/79 and his pulse is 68. His respiration is 18 and oxygen saturation is 98%.     Chief Complaint: Headache (Back pain, cough/)    Mr. Stinson presents for evaluation of headache, muscle ache, back pain, cough that started 2 days ago.  He denies any known COVID-19 exposures.  He denies any fevers, chills, sore throat, congestion, shortness of breath, chest pain, leg swelling, nausea, vomiting, diarrhea, anosmia or ageusia.  He has not taken anything for his symptoms.        Headache   This is a new problem. The current episode started in the past 7 days. The problem occurs constantly. The problem has been unchanged. The pain is located in the frontal region. The pain radiates to the upper back. The pain quality is not similar to prior headaches. The quality of the pain is described as aching. The pain is at a severity of 4/10. The pain is mild. Associated symptoms include back pain and coughing. Pertinent negatives include no abdominal pain, blurred vision, dizziness, ear pain, fever, loss of balance, nausea, neck pain, sinus pressure, sore throat or vomiting. Nothing aggravates the symptoms. He has tried nothing for the symptoms. The treatment provided no relief.       Constitution: Negative for appetite change, chills, sweating, fatigue and fever.   HENT: Negative for ear pain, ear discharge, postnasal drip, sinus pain, sinus pressure and sore throat.    Neck: Negative for neck pain and neck stiffness.   Cardiovascular: Negative for chest trauma, chest pain, leg swelling, palpitations, sob on exertion and passing out.   Eyes: Negative for blurred vision.   Respiratory: Positive for cough. Negative for shortness of breath.    Gastrointestinal: Negative for abdominal pain, nausea, vomiting and diarrhea. "   Genitourinary: Negative for dysuria, frequency and urgency.   Musculoskeletal: Positive for back pain and muscle ache. Negative for pain.   Skin: Negative for rash.   Neurological: Positive for headaches. Negative for dizziness, history of vertigo, light-headedness, passing out, facial drooping, speech difficulty, coordination disturbances and loss of balance.   Hematologic/Lymphatic: Negative for easy bruising/bleeding. Does not bruise/bleed easily.   Psychiatric/Behavioral: Negative for confusion.       Objective:      Physical Exam   Constitutional: He is oriented to person, place, and time. He appears well-developed. He is cooperative.  Non-toxic appearance. He does not appear ill. No distress.   HENT:   Head: Normocephalic and atraumatic.   Ears:   Right Ear: Hearing, tympanic membrane, external ear and ear canal normal.   Left Ear: Hearing, tympanic membrane, external ear and ear canal normal.   Nose: Nose normal. No mucosal edema, rhinorrhea or nasal deformity. No epistaxis. Right sinus exhibits no maxillary sinus tenderness and no frontal sinus tenderness. Left sinus exhibits no maxillary sinus tenderness and no frontal sinus tenderness.   Mouth/Throat: Uvula is midline, oropharynx is clear and moist and mucous membranes are normal. No trismus in the jaw. Normal dentition. No uvula swelling. No oropharyngeal exudate, posterior oropharyngeal edema or posterior oropharyngeal erythema. No tonsillar exudate.   Eyes: Conjunctivae and lids are normal. No scleral icterus.   Neck: Trachea normal and phonation normal. Neck supple. No edema present. No erythema present. No neck rigidity present.   Cardiovascular: Normal rate, regular rhythm, normal heart sounds and normal pulses.   Pulmonary/Chest: Effort normal and breath sounds normal. No stridor. No respiratory distress. He has no decreased breath sounds. He has no wheezes. He has no rhonchi. He has no rales.   Abdominal: Normal appearance.   Musculoskeletal:  Normal range of motion.         General: No deformity. Normal range of motion.   Lymphadenopathy:     He has no cervical adenopathy.   Neurological: He is alert and oriented to person, place, and time. He exhibits normal muscle tone. Coordination normal.   Skin: Skin is warm, dry, intact, not diaphoretic and not pale.   Psychiatric: His speech is normal and behavior is normal. Judgment and thought content normal.   Nursing note and vitals reviewed.        Results for orders placed or performed in visit on 08/05/22   POCT COVID-19 Rapid Screening   Result Value Ref Range    POC Rapid COVID Negative Negative     Acceptable Yes        Assessment:       1. Viral syndrome    2. Cough          Plan:         Viral syndrome    Cough  -     POCT COVID-19 Rapid Screening    Other orders  -     ibuprofen (ADVIL,MOTRIN) 800 MG tablet; Take 1 tablet (800 mg total) by mouth every 6 (six) hours as needed for Pain.  Dispense: 30 tablet; Refill: 0  -     benzonatate (TESSALON) 200 MG capsule; Take 1 capsule (200 mg total) by mouth 3 (three) times daily as needed for Cough (cough).  Dispense: 30 capsule; Refill: 0    Diagnoses and plan discussed with the patient, as well as the expected course and duration of his symptoms.  All questions and concerns were addressed prior to discharge.  He was advised to follow up with his PCP within 1 week if symptoms do not improve.  Emergency department precautions were given.  Patient verbalized understanding and was happy with the plan of care.   Note dictated with voice recognition software, please excuse any grammatical errors.    Patient Instructions   PLEASE READ YOUR DISCHARGE INSTRUCTIONS ENTIRELY AS IT CONTAINS IMPORTANT INFORMATION.  - Rest.    - Drink plenty of fluids.    - Tylenol or Ibuprofen as directed as needed for fever/pain.    - If you were prescribed antibiotics, please take them to completion.  - If you are female and on birth control pills - please use  additional methods of contraception to prevent pregnancy while on antibiotics and for one cycle after.   - If you were prescribed a narcotic medication, a cough syrup, or a muscle relaxer, do not drive or operate heavy equipment or machinery while taking these medications, as they can cause drowsiness.   - If a referral to a specialty was made today, you should receive a phone call in the next few days to schedule an appt.  Please call [phone] to schedule an appt if have not gotten a phone call in the next few days.  - If you smoke, please stop smoking.  -You must understand that you've received an Urgent Care treatment only and that you may be released before all your medical problems are known or treated. You, the patient, will arrange for follow up care as instructed. Please arrange follow up with your primary medical clinic as soon as possible.   - Follow up with your PCP or specialty clinic as directed in the next 1-2 weeks if not improved or as needed.  You can call [phone] to schedule an appointment with the appropriate provider.    - Please return to Urgent Care or to the Emergency Department if your symptoms worsen.    Patient aware and verbalized understanding.

## 2022-08-05 NOTE — PATIENT INSTRUCTIONS
PLEASE READ YOUR DISCHARGE INSTRUCTIONS ENTIRELY AS IT CONTAINS IMPORTANT INFORMATION.  - Rest.    - Drink plenty of fluids.    - Tylenol or Ibuprofen as directed as needed for fever/pain.    - If you were prescribed antibiotics, please take them to completion.  - If you are female and on birth control pills - please use additional methods of contraception to prevent pregnancy while on antibiotics and for one cycle after.   - If you were prescribed a narcotic medication, a cough syrup, or a muscle relaxer, do not drive or operate heavy equipment or machinery while taking these medications, as they can cause drowsiness.   - If a referral to a specialty was made today, you should receive a phone call in the next few days to schedule an appt.  Please call [phone] to schedule an appt if have not gotten a phone call in the next few days.  - If you smoke, please stop smoking.  -You must understand that you've received an Urgent Care treatment only and that you may be released before all your medical problems are known or treated. You, the patient, will arrange for follow up care as instructed. Please arrange follow up with your primary medical clinic as soon as possible.   - Follow up with your PCP or specialty clinic as directed in the next 1-2 weeks if not improved or as needed.  You can call [phone] to schedule an appointment with the appropriate provider.    - Please return to Urgent Care or to the Emergency Department if your symptoms worsen.    Patient aware and verbalized understanding.

## 2023-11-10 DIAGNOSIS — S66.397A: Primary | ICD-10-CM

## 2023-12-01 ENCOUNTER — CLINICAL SUPPORT (OUTPATIENT)
Dept: REHABILITATION | Facility: HOSPITAL | Age: 33
End: 2023-12-01
Payer: MEDICAID

## 2023-12-01 DIAGNOSIS — M25.542 ARTHRALGIA OF LEFT HAND: ICD-10-CM

## 2023-12-01 DIAGNOSIS — M25.60 STIFFNESS IN JOINT: Primary | ICD-10-CM

## 2023-12-01 DIAGNOSIS — M62.81 MUSCLE WEAKNESS: ICD-10-CM

## 2023-12-01 PROCEDURE — 97530 THERAPEUTIC ACTIVITIES: CPT | Mod: PN

## 2023-12-01 PROCEDURE — 97166 OT EVAL MOD COMPLEX 45 MIN: CPT | Mod: PN

## 2023-12-01 PROCEDURE — L3933 FO W/O JOINTS CF: HCPCS | Mod: PN
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
## 2023-12-01 NOTE — PATIENT INSTRUCTIONS
"[facility], OCCUPATIONAL THERAPY  HOME EXERCISE PROGRAM     Complete the following two massages for 4 minutes, 2-3x/day:                                                       Complete the following exercises for 10-15 repetitions, 3x/day:         AROM: DIP Flexion / Extension  Pinch middle knuckle to prevent bending.   Bend end knuckle until stretch is felt. Hold   3 seconds. Relax. Straighten finger as far as possible.      AROM: PIP Flexion / Extension  Pinch bottom knuckle  to prevent bending.   Actively bend middle knuckle until stretch is felt.   Hold 3 seconds. Relax. Straighten finger as far as possible.      AROM: Isolated PIP Flexion  Bend only middle joint of your finger,   keeping other fingers straight with other hand.      AROM: Isolated MCP Flexion / Extension ("Wave")   Bend only your large, bottom knuckles. Hold 3 seconds.   Keep the tips of your fingers straight. Straighten fingers.      AROM: Isolated IPJ Flexion / Extension ("Hook")   Bend only your middle and end knuckles. Hold 3 seconds.   Straighten your fingers.       AROM: MCP and PIP Flexion / Extension ("Straight Fist")  Bend your bottom and middle knuckles, keeping the tips of your fingers straight.   Try to touch the pads of your fingers on your palm. Hold 3 seconds.   Straighten your fingers.       AROM: Composite Flexion / Extension ("Full Fist")  Bend every joint in your hand into a fist. Hold 3 seconds.   Straighten your fingers.         AROM: [place]oste Extension ("Finger Lifts")  Lift your finger off of the table one at a time. Hold 3 seconds.   Relax your finger.      AROM: Abduction / Adduction  With hand flat on table, spread all fingers apart,   then bring them together as close as possible.        Therapist: Flo Triana, OTR/L         "

## 2023-12-04 NOTE — PROGRESS NOTES
"  Occupational Therapy Daily Treatment Note     Name: Anirudh Stinson  Clinic Number: [id number]    Therapy Diagnosis:   Encounter Diagnoses   Name Primary?    Arthralgia of left hand Yes    Muscle weakness      Physician: Abdoul Garcia MD    Visit Date: 12/5/2023     Physician Orders: Eval and Treat  Medical Diagnosis: L SF PIP fracture with pinning  Surgical Procedure and Date: Note states flexion contracture release after P1 fracture, 10/31/23 / Date of Injury: September 23  Evaluation Date: 12/1/2023  Insurance Authorization Period Expiration: 12/31/23  Plan of Care Certification Period: 1/26/23  Date of Return to MD: NA  Visit # / Visits authorized: 2 / 20  FOTO: 1/3     Precautions:  Standard, PIP arthrodesis and extensor tendon release protocol.   Time In:8:35 am  Time Out: 9:15 am   Total Billable Time: 40  minutes      Subjective     Pt reports: "Its a little better, I have only been having a little pain at the tip."  he was compliant with home exercise program given last session.   Response to previous treatment:decrease in pain; increase in ROM  Functional change: Same     Pain: 6/10  Location: left hand    Objective   Observation/Appearance: Presents with splint already and steristrips still in place.      Special Tests: none     Edema. Measured in centimeters.    12/1/2023 12/1/2023         Left Right       Wrist Crease           MPS 26 22       SF  P1  P2  P3     7.4  7  6.4    6.3  5.8  5             Elbow and Wrist ROM. Measured in degrees.    12/1/2023 12/1/2023         Left Right       Elbow Ext/Flex           Supination/Pronation           Wrist Ext/Flex WNL         Wrist RD/UD WNL            Hand ROM. Measured in degrees.    12/1/2023 12/1/2023         Left Right                   Index: MP  WNL                    PIP                          DIP                       Long:  MP                      PIP                      DIP                       Ring:   MP                      PIP            "           DIP                       Small:  MP -15/48                     PIP -47/59                     DIP 0/0                                                                Opposition WNL             Strength (Dynamometer) and Pinch Strength (Pinch Gauge)  Measured in pounds.    12/1/2023 12/1/2023         Left Right       Rung II NT         Lemus Pinch NT         3pt Pinch NT            Sensation:   Not formally assessed    12/1/2023 12/1/2023     Left Right   [place] Job       Normal 1.65-2.83 WNL     Diminished Light Touch 3.22-3.61       Diminished Protective 3.84-4.31       Loss of Protective 4.56-6.65       Untestable >6.65             CMS Impairment/Limitation/Restriction for FOTO Hand Survey     Therapist reviewed FOTO scores for Anirudh Stinson on 12/1/2023.   FOTO documents entered into [employer] - see Media section.     Limitation Score: 72%            Treatment      Sriram received the following supervised modalities after being cleared for contradictions for 10 minutes:   -Patient tolerates MHP x 10 min in order to decrease pain and increase soft tissue extensibility in preparation for ROM, concurrent with assessment of deficits.      Sriram received the following manual therapy techniques for 20 minutes:   -I provide gentle STM/RGM to LSF in order to increase soft tissue extensibility, decrease pain, and tenderness/hypersensitivity in the stated area, and promote scar tissue remodeling.  X 10 min  -grade 1 and 2 joint mobs to the PIPJ  -I provide gentle scar mob to decrease scar tenderness, adherence, and hypersensitivity.  X 10 min    Sriram participated in dynamic functional therapeutic activities to improve functional performance for 20  minutes, including:  -Therapist A PROM  - joint blocking   - TGEs  - dowel fist<>hook     Home Exercises and Education Provided     Education provided:   - HEP in place  - Progress towards goals     Written Home Exercises Provided: yes.  Exercises were reviewed  and Sriram was able to demonstrate them prior to the end of the session.  Sriram demonstrated good  understanding of the HEP provided.   .   See EMR under Patient Instructions for exercises provided prior visit.        Assessment     Upon arrival, patient states that his finger feels about the same, but he has been compliant with scar massage and HEP given at initial evaluation. He reports that most of his pain is at the distal end of his finger. Also, he still has some drainage at the incision site, but improving daily. Today's tx session focused on scar mob and improving P/AROM of the LSF. Scar mob is tolerated well with deeper pressure to the area. Patient tolerates P/AA/AROM exs to per protocol to increase functional and available ROM of LSF. Patient demos increase in ROM post exs and reports pain/discomfort at the joints. He is lacking true active ROM at the DIP, but we continue to focus on jt blocking and jt mobs to the stated areas. I instruct patient on specific HEP to promote carryover of ROM gains. Pt would continue to benefit from skilled OT. Continue POC and progress as tolerated per protocol.      Sriram is progressing well towards his goals and there are no updates to goals at this time. Pt prognosis is Fair.     Pt will continue to benefit from skilled outpatient occupational therapy to address the deficits listed in the problem list on initial evaluation provide pt/family education and to maximize pt's level of independence in the home and community environment.     Anticipated barriers to occupational therapy: everity of contracture. Therapy and splinting delay. Pt surgery 10/31/23 first visit here on 12/1/23 pt still had steristrips.    Pt's spiritual, cultural and educational needs considered and pt agreeable to plan of care and goals.    Goals:  The following goals were discussed with the patient and patient is in agreement with them as to be addressed in the treatment plan.   Short term Goals:  1)  Initiate HEP Met, 12/5/2023  2) Pt will increase AROM of L SF by 5-10 degrees in order to assist with functional carrying moving and handling by 4 weeks.  3) Pt will reduce edema by .5-1 cm in affected L SF by 4 weeks.  4) Pt will reduce pain to less than 4/10 by 4 weeks.  5) Pt will increase functional  strength by 5# in order to A in opening containers for med management or home management tasks by 4 weeks.   6) Patient will be able to achieve less than or equal to 50% on the FOTO, demonstrating overall improved functional ability with upper extremity. (Self-care category)     Long Term Goals:  Goals to be met by discharge:  1) Independent with HEP  2) Pt will increase L hand MURRAY by 25 degrees in order to increase functional use for grasp with home management or work related tasks by d/c.   3) Pt will decrease edema to trace or none to increase functional ROM by d/c.   4) Pt will decrease pain to trace or none while completing light home management tasks or work related tasks by d/c.   5) Patient will be able to achieve less than or equal to 25% on the FOTO, demonstrating overall improved functional ability with upper extremity.  (Self-care category)    Plan   Continue skilled OT POC and progress per protocol as tolerated.   Updates/Grading for next session: A/PROM, jt blocking, TGEs, scar mob      Babita Lezama OT

## 2023-12-05 ENCOUNTER — CLINICAL SUPPORT (OUTPATIENT)
Dept: REHABILITATION | Facility: HOSPITAL | Age: 33
End: 2023-12-05
Payer: MEDICAID

## 2023-12-05 DIAGNOSIS — M62.81 MUSCLE WEAKNESS: ICD-10-CM

## 2023-12-05 DIAGNOSIS — M25.542 ARTHRALGIA OF LEFT HAND: Primary | ICD-10-CM

## 2023-12-05 PROCEDURE — 97530 THERAPEUTIC ACTIVITIES: CPT | Mod: PN

## 2023-12-05 NOTE — PROGRESS NOTES
"  Occupational Therapy Daily Treatment Note     Name: Anirudh Stinson  Clinic Number: [id number]    Therapy Diagnosis:   Encounter Diagnoses   Name Primary?    Arthralgia of left hand Yes    Muscle weakness     Stiffness in joint        Physician: Abdoul Garcia MD    Visit Date: 12/6/2023     Physician Orders: Eval and Treat  Medical Diagnosis: L SF PIP fracture with pinning  Surgical Procedure and Date: Note states flexion contracture release after P1 fracture, 10/31/23 / Date of Injury: September 23  Evaluation Date: 12/1/2023  Insurance Authorization Period Expiration: 12/31/23  Plan of Care Certification Period: 1/26/23  Date of Return to MD: NA  Visit # / Visits authorized: 3 / 20  FOTO: 1/3     Precautions:  Standard, PIP arthrodesis and extensor tendon release protocol.   Time In: 9:20 am  Time Out: 10:00 am  Total Billable Time: 40 minutes      Subjective     Pt reports: "It's still a little tender at the tip."  he was compliant with home exercise program given last session.   Response to previous treatment:decrease in pain; increase in ROM  Functional change: Same     Pain: 5/10  Location: left hand    Objective   Observation/Appearance: Presents with splint already and steristrips still in place.      Special Tests: none     Edema. Measured in centimeters.    12/1/2023 12/1/2023         Left Right       Wrist Crease           MPS 26 22       SF  P1  P2  P3     7.4  7  6.4    6.3  5.8  5             Elbow and Wrist ROM. Measured in degrees.    12/1/2023 12/1/2023         Left Right       Elbow Ext/Flex           Supination/Pronation           Wrist Ext/Flex WNL         Wrist RD/UD WNL            Hand ROM. Measured in degrees.    12/1/2023 12/1/2023 12/6/2023       Left Right Left                  Index: MP  WNL                    PIP                          DIP                       Long:  MP                      PIP                      DIP                       Ring:   MP                      PIP      "                 DIP                       Small:  MP -15/48   0/80                 PIP -47/59    -45/65                 DIP 0/0    0/0                                                             Opposition WNL             Strength (Dynamometer) and Pinch Strength (Pinch Gauge)  Measured in pounds.    12/1/2023 12/1/2023         Left Right       Rung II NT         Lemus Pinch NT         3pt Pinch NT            Sensation:   Not formally assessed    12/1/2023 12/1/2023     Left Right   [place] Job       Normal 1.65-2.83 WNL     Diminished Light Touch 3.22-3.61       Diminished Protective 3.84-4.31       Loss of Protective 4.56-6.65       Untestable >6.65             CMS Impairment/Limitation/Restriction for FOTO Hand Survey     Therapist reviewed FOTO scores for Anirudh Stinson on 12/1/2023.   FOTO documents entered into [employer] - see Media section.     Limitation Score: 72%            Treatment      Sriram received the following supervised modalities after being cleared for contradictions for 10 minutes:   -Patient tolerates MHP x 10 min in order to decrease pain and increase soft tissue extensibility in preparation for ROM, concurrent with assessment of deficits.      Sriram received the following manual therapy techniques for 20 minutes:   -I provide gentle STM/RGM to LSF in order to increase soft tissue extensibility, decrease pain, and tenderness/hypersensitivity in the stated area, and promote scar tissue remodeling.  X 10 min  -grade 1 and 2 joint mobs to the PIPJ  -I provide gentle scar mob to decrease scar tenderness, adherence, and hypersensitivity.  X 10 min    Sriram participated in dynamic functional therapeutic activities to improve functional performance for 20  minutes, including:  -Therapist A PROM  - joint blocking   - TGEs with ADD squeeze  - dowel fist<>hook   - resisted finger   - wrist ROM sustained  with large dowel    Home Exercises and Education Provided     Education provided:   - HEP  in place  - Progress towards goals     Written Home Exercises Provided: yes.  Exercises were reviewed and Sriram was able to demonstrate them prior to the end of the session.  Sriram demonstrated good  understanding of the HEP provided.   .   See EMR under Patient Instructions for exercises provided prior visit.        Assessment   Patient continues to note some mild tenderness at the distal end of his LSF. The scar is no longer draining and is dry with no SOI. Today's tx session focused on scar mob and improving P/AROM of the LSF. I provide a silicone digit compression sleeve to allow some compression for edema reduction and scar management. Patient verbalizes understanding of proper wear schedule.  Scar mob is tolerated well with deeper pressure to the area. Patient tolerates P/AA/AROM exs to per protocol to increase functional and available ROM of LSF. Patient demos increase in ROM post exs and reports pain/discomfort at the joints. Per assessment, he demos a decent increase in ROM since initial evaluation. He is lacking true active ROM at the DIP, but we continue to focus on jt blocking and jt mobs to the stated areas. I instruct patient on specific HEP to promote carryover of ROM gains. Pt would continue to benefit from skilled OT. Continue POC and progress as tolerated per protocol    Upon arrival, patient states that his finger feels about the same, but he has been compliant with scar massage and HEP given at initial     Sriram is progressing well towards his goals and there are no updates to goals at this time. Pt prognosis is Fair.     Pt will continue to benefit from skilled outpatient occupational therapy to address the deficits listed in the problem list on initial evaluation provide pt/family education and to maximize pt's level of independence in the home and community environment.     Anticipated barriers to occupational therapy: everity of contracture. Therapy and splinting delay. Pt surgery 10/31/23  first visit here on 12/1/23 pt still had steristrips.    Pt's spiritual, cultural and educational needs considered and pt agreeable to plan of care and goals.    Goals:  The following goals were discussed with the patient and patient is in agreement with them as to be addressed in the treatment plan.   Short term Goals:  1) Initiate HEP Met, 12/6/2023  2) Pt will increase AROM of L SF by 5-10 degrees in order to assist with functional carrying moving and handling by 4 weeks. Met, 12/6/2023  3) Pt will reduce edema by .5-1 cm in affected L SF by 4 weeks.  4) Pt will reduce pain to less than 4/10 by 4 weeks.  5) Pt will increase functional  strength by 5# in order to A in opening containers for med management or home management tasks by 4 weeks.   6) Patient will be able to achieve less than or equal to 50% on the FOTO, demonstrating overall improved functional ability with upper extremity. (Self-care category)     Long Term Goals:  Goals to be met by discharge:  1) Independent with HEP  2) Pt will increase L hand MURRAY by 25 degrees in order to increase functional use for grasp with home management or work related tasks by d/c.   3) Pt will decrease edema to trace or none to increase functional ROM by d/c.   4) Pt will decrease pain to trace or none while completing light home management tasks or work related tasks by d/c.   5) Patient will be able to achieve less than or equal to 25% on the FOTO, demonstrating overall improved functional ability with upper extremity.  (Self-care category)    Plan   Continue skilled OT POC and progress per protocol as tolerated.   Updates/Grading for next session: A/PROM, jt blocking, TGEs, scar mob      Babita Lezama, OT

## 2023-12-06 ENCOUNTER — CLINICAL SUPPORT (OUTPATIENT)
Dept: REHABILITATION | Facility: HOSPITAL | Age: 33
End: 2023-12-06
Payer: MEDICAID

## 2023-12-06 DIAGNOSIS — M62.81 MUSCLE WEAKNESS: ICD-10-CM

## 2023-12-06 DIAGNOSIS — M25.60 STIFFNESS IN JOINT: ICD-10-CM

## 2023-12-06 DIAGNOSIS — M25.542 ARTHRALGIA OF LEFT HAND: Primary | ICD-10-CM

## 2023-12-06 PROCEDURE — 97530 THERAPEUTIC ACTIVITIES: CPT | Mod: PN

## 2023-12-11 ENCOUNTER — CLINICAL SUPPORT (OUTPATIENT)
Dept: REHABILITATION | Facility: HOSPITAL | Age: 33
End: 2023-12-11
Payer: MEDICAID

## 2023-12-11 DIAGNOSIS — M25.542 ARTHRALGIA OF LEFT HAND: Primary | ICD-10-CM

## 2023-12-11 DIAGNOSIS — M25.60 STIFFNESS IN JOINT: ICD-10-CM

## 2023-12-11 DIAGNOSIS — M62.81 MUSCLE WEAKNESS: ICD-10-CM

## 2023-12-11 PROCEDURE — L3933 FO W/O JOINTS CF: HCPCS | Mod: PN

## 2023-12-11 PROCEDURE — 97530 THERAPEUTIC ACTIVITIES: CPT | Mod: PN

## 2023-12-11 NOTE — PROGRESS NOTES
"  Occupational Therapy Daily Treatment Note     Name: Anirudh Stinson  Clinic Number: [id number]    Therapy Diagnosis:   Encounter Diagnoses   Name Primary?    Arthralgia of left hand Yes    Muscle weakness     Stiffness in joint          Physician: Abdoul Garcia MD    Visit Date: 12/11/2023     Physician Orders: Eval and Treat  Medical Diagnosis: L SF PIP fracture with pinning  Surgical Procedure and Date: Note states flexion contracture release after P1 fracture, 10/31/23 / Date of Injury: September 23  Evaluation Date: 12/1/2023  Insurance Authorization Period Expiration: 12/31/23  Plan of Care Certification Period: 1/26/23  Date of Return to MD: NA  Visit # / Visits authorized: 4 / 20  FOTO: 1/3     Precautions:  Standard, PIP arthrodesis and extensor tendon release protocol.   Time In: 106 am  Time Out: 145 am  Total Billable Time: 39 minutes      Subjective     Pt reports: "I sat the MD and they said no more with the splint but to start moving it more into flexion ."  he was compliant with home exercise program given last session.   Response to previous treatment:decrease in pain; increase in ROM  Functional change: Same     Pain: 5/10  Location: left hand    Objective   Observation/Appearance: Presents with splint already and steristrips still in place.      Special Tests: none     Edema. Measured in centimeters.    12/1/2023 12/1/2023         Left Right       Wrist Crease           MPS 26 22       SF  P1  P2  P3     7.4  7  6.4    6.3  5.8  5             Elbow and Wrist ROM. Measured in degrees.    12/1/2023 12/1/2023         Left Right       Elbow Ext/Flex           Supination/Pronation           Wrist Ext/Flex WNL         Wrist RD/UD WNL            Hand ROM. Measured in degrees.    12/1/2023 12/1/2023 12/6/2023       Left Right Left                  Index: MP  WNL                    PIP                          DIP                       Long:  MP                      PIP                      DIP         "               Ring:   MP                      PIP                      DIP                       Small:  MP -15/48   0/80                 PIP -47/59    -45/65                 DIP 0/0    0/0                                                             Opposition WNL             Strength (Dynamometer) and Pinch Strength (Pinch Gauge)  Measured in pounds.    12/1/2023 12/1/2023         Left Right       Rung II NT         Lemus Pinch NT         3pt Pinch NT            Sensation:   Not formally assessed    12/1/2023 12/1/2023     Left Right   [place] Job       Normal 1.65-2.83 WNL     Diminished Light Touch 3.22-3.61       Diminished Protective 3.84-4.31       Loss of Protective 4.56-6.65       Untestable >6.65             CMS Impairment/Limitation/Restriction for FOTO Hand Survey     Therapist reviewed FOTO scores for Anirudh Stinson on 12/1/2023.   FOTO documents entered into [employer] - see Media section.     Limitation Score: 72%            Treatment      Sriram received the following supervised modalities after being cleared for contradictions for 10 minutes:   Fluidotherapy: To L for 10 min, continuous air, 110 deg, air speed 100 to decrease pain, edema & scar tissue and increased tissue extensibility.       Sriram received the following manual therapy techniques for 20 minutes:   -I provide gentle STM/RGM to LSF in order to increase soft tissue extensibility, decrease pain, and tenderness/hypersensitivity in the stated area, and promote scar tissue remodeling.  X 10 min  -grade 1 and 2 joint mobs to the PIPJ  -I provide gentle scar mob to decrease scar tenderness, adherence, and hypersensitivity.  X 10 min    Sriram participated in dynamic functional therapeutic activities to improve functional performance for 9  minutes, including:  -Therapist A PROM  - joint blocking   - TGEs with ADD squeeze  - dowel fist<>hook   - resisted finger   - wrist ROM sustained  with large dowel    OT made custom fiit exercises  orthosis focusing on blocking MP joint and movement at PIP of SF     Home Exercises and Education Provided     Education provided:   - HEP in place  - Progress towards goals     Written Home Exercises Provided: yes.  Exercises were reviewed and Sriram was able to demonstrate them prior to the end of the session.  Sriram demonstrated good  understanding of the HEP provided.   .   See EMR under Patient Instructions for exercises provided prior visit.        Assessment   Patient states seeing MD this week and they would like him to come out of the splint. Still noted PIP flexion contracture of SF noted. OT made pt exercise splint blocking MP and P1 joint to promote flx/ext of PIP of SF. He tolerated this fair. He does have more ROM at MP and DIP but still very limited at PIP.     Upon arrival, patient states that his finger feels about the same, but he has been compliant with scar massage and HEP given at initial     Sriram is progressing well towards his goals and there are no updates to goals at this time. Pt prognosis is Fair.     Pt will continue to benefit from skilled outpatient occupational therapy to address the deficits listed in the problem list on initial evaluation provide pt/family education and to maximize pt's level of independence in the home and community environment.     Anticipated barriers to occupational therapy: everity of contracture. Therapy and splinting delay. Pt surgery 10/31/23 first visit here on 12/1/23 pt still had steristrips.    Pt's spiritual, cultural and educational needs considered and pt agreeable to plan of care and goals.    Goals:  The following goals were discussed with the patient and patient is in agreement with them as to be addressed in the treatment plan.   Short term Goals:  1) Initiate HEP Met, 12/11/2023  2) Pt will increase AROM of L SF by 5-10 degrees in order to assist with functional carrying moving and handling by 4 weeks. Met, 12/11/2023  3) Pt will reduce edema by  .5-1 cm in affected L SF by 4 weeks.  4) Pt will reduce pain to less than 4/10 by 4 weeks.  5) Pt will increase functional  strength by 5# in order to A in opening containers for med management or home management tasks by 4 weeks.   6) Patient will be able to achieve less than or equal to 50% on the FOTO, demonstrating overall improved functional ability with upper extremity. (Self-care category)     Long Term Goals:  Goals to be met by discharge:  1) Independent with HEP  2) Pt will increase L hand MURRAY by 25 degrees in order to increase functional use for grasp with home management or work related tasks by d/c.   3) Pt will decrease edema to trace or none to increase functional ROM by d/c.   4) Pt will decrease pain to trace or none while completing light home management tasks or work related tasks by d/c.   5) Patient will be able to achieve less than or equal to 25% on the FOTO, demonstrating overall improved functional ability with upper extremity.  (Self-care category)    Plan   Continue skilled OT POC and progress per protocol as tolerated.   Updates/Grading for next session: A/PROM, jt blocking, TGEs, scar mob      Flo Triana, OT

## 2023-12-13 NOTE — PROGRESS NOTES
"  Occupational Therapy Daily Treatment Note     Name: Anirudh Stinson  Clinic Number: [id number]    Therapy Diagnosis:   Encounter Diagnoses   Name Primary?    Arthralgia of left hand Yes    Muscle weakness     Stiffness in joint      Physician: Abdoul Garcia MD    Visit Date: 12/14/2023     Physician Orders: Eval and Treat  Medical Diagnosis: L SF PIP fracture with pinning  Surgical Procedure and Date: Note states flexion contracture release after P1 fracture, 10/31/23 / Date of Injury: September 23  Evaluation Date: 12/1/2023  Insurance Authorization Period Expiration: 12/31/23  Plan of Care Certification Period: 1/26/23  Date of Return to MD: NA  Visit # / Visits authorized: 5 / 20  FOTO: 1/3     Precautions:  Standard, PIP arthrodesis and extensor tendon release protocol.   Time In: 8:45 am  Time Out: 9:30 am  Total Billable Time: 45 minutes      Subjective     Pt reports: "Its still pretty stiff especially when I woke up this morning."  he was compliant with home exercise program given last session.   Response to previous treatment:decrease in pain; increase in ROM  Functional change: Same     Pain: 4/10  Location: left hand    Objective   Observation/Appearance: Presents with splint already and steristrips still in place.      Special Tests: none     Edema. Measured in centimeters.    12/1/2023 12/1/2023         Left Right       Wrist Crease           MPS 26 22       SF  P1  P2  P3     7.4  7  6.4    6.3  5.8  5             Elbow and Wrist ROM. Measured in degrees.    12/1/2023 12/1/2023         Left Right       Elbow Ext/Flex           Supination/Pronation           Wrist Ext/Flex WNL         Wrist RD/UD WNL            Hand ROM. Measured in degrees.    12/1/2023 12/1/2023 12/6/2023       Left Right Left                  Index: MP  WNL                    PIP                          DIP                       Long:  MP                      PIP                      DIP                       Ring:   MP         "              PIP                      DIP                       Small:  MP -15/48   0/80                 PIP -47/59    -45/65                 DIP 0/0    0/0                                                             Opposition WNL             Strength (Dynamometer) and Pinch Strength (Pinch Gauge)  Measured in pounds.    12/1/2023 12/1/2023         Left Right       Rung II NT         Lemus Pinch NT         3pt Pinch NT            Sensation:   Not formally assessed    12/1/2023 12/1/2023     Left Right   [place] Job       Normal 1.65-2.83 WNL     Diminished Light Touch 3.22-3.61       Diminished Protective 3.84-4.31       Loss of Protective 4.56-6.65       Untestable >6.65             CMS Impairment/Limitation/Restriction for FOTO Hand Survey     Therapist reviewed FOTO scores for Anirudh Stinson on 12/1/2023.   FOTO documents entered into [employer] - see Media section.     Limitation Score: 72%            Treatment      Sriram received the following supervised modalities after being cleared for contradictions for 10 minutes:   Patient tolerates tx of therapeutic fluidotherapy in order to decrease hypersensitivity, decrease pain, and/or increase soft tissue extensibility during AROM.       Sriram received the following manual therapy techniques for 20 minutes:   -I provide gentle STM/RGM to LSF in order to increase soft tissue extensibility, decrease pain, and tenderness/hypersensitivity in the stated area, and promote scar tissue remodeling.  X 10 min  -grade 1 and 2 joint mobs to the PIPJ  -I provide gentle scar mob to decrease scar tenderness, adherence, and hypersensitivity.  X 10 min    Sriram participated in dynamic functional therapeutic activities to improve functional performance for 20 minutes, including:  -Therapist A PROM  - joint blocking   - TGEs in fluido  - dowel fist<>hook   - resisted finger   - wrist ROM sustained  with large dowel  - towel scrunches  - iso towel squeezes    Home Exercises  and Education Provided     Education provided:   - HEP in place  - Progress towards goals     Written Home Exercises Provided: yes.  Exercises were reviewed and Sriram was able to demonstrate them prior to the end of the session.  Sriram demonstrated good  understanding of the HEP provided.   .   See EMR under Patient Instructions for exercises provided prior visit.        Assessment   Upon arrival, patient states that he has been using the blocking orthosis to promote flex/ext of the SF PIP.  There is still limited movement at the PIPJ 2/2 scar adherence and weakness. Patient tolerates tx of therapeutic fluidotherapy in order to decrease hypersensitivity, decrease pain, and/or increase soft tissue extensibility during AROM. Scar mob with scar extractor providedto decrease scar tenderness, adherence, and hypersensitivity.  Grade 1 and 2 joint mobs to the PIPJ provided to promote ROM gains. Patient tolerates P/AA/AROM exs to per protocol to increase functional and available ROM of LSF. Patient demos increase in ROM post exs (PIP flexion to about 73 deg). I initiate towel scrunches and isometric towel squeezes to promote available ROM of the LSF. These are jeanne well today, no true pain noted following tx session today. I instruct patient on specific HEP to promote carryover of ROM gains. Patient would benefit from ongoing skilled OT services. Continue POC and progress as tolerated per protocol.      Sriram is progressing well towards his goals and there are no updates to goals at this time. Pt prognosis is Fair.     Pt will continue to benefit from skilled outpatient occupational therapy to address the deficits listed in the problem list on initial evaluation provide pt/family education and to maximize pt's level of independence in the home and community environment.     Anticipated barriers to occupational therapy: everity of contracture. Therapy and splinting delay. Pt surgery 10/31/23 first visit here on 12/1/23 pt  still had steristrips.    Pt's spiritual, cultural and educational needs considered and pt agreeable to plan of care and goals.    Goals:  The following goals were discussed with the patient and patient is in agreement with them as to be addressed in the treatment plan.   Short term Goals:  1) Initiate HEP Met, 12/14/2023  2) Pt will increase AROM of L SF by 5-10 degrees in order to assist with functional carrying moving and handling by 4 weeks. Met, 12/14/2023  3) Pt will reduce edema by .5-1 cm in affected L SF by 4 weeks.  4) Pt will reduce pain to less than 4/10 by 4 weeks.  5) Pt will increase functional  strength by 5# in order to A in opening containers for med management or home management tasks by 4 weeks.   6) Patient will be able to achieve less than or equal to 50% on the FOTO, demonstrating overall improved functional ability with upper extremity. (Self-care category)     Long Term Goals:  Goals to be met by discharge:  1) Independent with HEP  2) Pt will increase L hand MURRAY by 25 degrees in order to increase functional use for grasp with home management or work related tasks by d/c.   3) Pt will decrease edema to trace or none to increase functional ROM by d/c.   4) Pt will decrease pain to trace or none while completing light home management tasks or work related tasks by d/c.   5) Patient will be able to achieve less than or equal to 25% on the FOTO, demonstrating overall improved functional ability with upper extremity.  (Self-care category)    Plan   Continue skilled OT POC and progress per protocol as tolerated.   Updates/Grading for next session: A/PROM, jt blocking, TGEs, scar mob      Babita Lezama, OT

## 2023-12-14 ENCOUNTER — CLINICAL SUPPORT (OUTPATIENT)
Dept: REHABILITATION | Facility: HOSPITAL | Age: 33
End: 2023-12-14
Payer: MEDICAID

## 2023-12-14 DIAGNOSIS — M62.81 MUSCLE WEAKNESS: ICD-10-CM

## 2023-12-14 DIAGNOSIS — M25.542 ARTHRALGIA OF LEFT HAND: Primary | ICD-10-CM

## 2023-12-14 DIAGNOSIS — M25.60 STIFFNESS IN JOINT: ICD-10-CM

## 2023-12-14 PROCEDURE — 97530 THERAPEUTIC ACTIVITIES: CPT | Mod: PN

## 2023-12-18 NOTE — PROGRESS NOTES
"  Occupational Therapy Daily Treatment Note     Name: Anirudh Stinson  Clinic Number: [id number]    Therapy Diagnosis:   Encounter Diagnoses   Name Primary?    Arthralgia of left hand Yes    Stiffness in joint     Muscle weakness        Physician: Abdoul Garcia MD    Visit Date: 12/19/2023     Physician Orders: Eval and Treat  Medical Diagnosis: L SF PIP fracture with pinning  Surgical Procedure and Date: Note states flexion contracture release after P1 fracture, 10/31/23 / Date of Injury: September 23  Evaluation Date: 12/1/2023  Insurance Authorization Period Expiration: 12/31/23  Plan of Care Certification Period: 1/26/23  Date of Return to MD: 1/8/2024  Visit # / Visits authorized: 6 / 20  FOTO: 1/3     Precautions:  Standard, PIP arthrodesis and extensor tendon release protocol.   Time In: 10:20 am   Time Out: 10:45 am  Total Billable Time: 25 minutes (patient arrived 20 min late to tx session)      Subjective     Pt reports: "It's been driving me crazy, I've been having a lot of pain right here." *points to LSF PIPJ  he was compliant with home exercise program given last session.   Response to previous treatment:decrease in pain; increase in ROM  Functional change: Same     Pain: 3/10  Location: left hand    Objective   Observation/Appearance: Presents with splint already and steristrips still in place.      Special Tests: none     Edema. Measured in centimeters.    12/1/2023 12/1/2023         Left Right       Wrist Crease           MPS 26 22       SF  P1  P2  P3     7.4  7  6.4    6.3  5.8  5             Elbow and Wrist ROM. Measured in degrees.    12/1/2023 12/1/2023         Left Right       Elbow Ext/Flex           Supination/Pronation           Wrist Ext/Flex WNL         Wrist RD/UD WNL            Hand ROM. Measured in degrees.    12/1/2023 12/1/2023 12/6/2023       Left Right Left                  Index: MP  WNL                    PIP                          DIP                       Long:  MP         "              PIP                      DIP                       Ring:   MP                      PIP                      DIP                       Small:  MP -15/48   0/80                 PIP -47/59    -45/65                 DIP 0/0    0/0                                                             Opposition WNL             Strength (Dynamometer) and Pinch Strength (Pinch Gauge)  Measured in pounds.    12/1/2023 12/1/2023 12/19/23 12/19/23      Left Right  Left Right    Rung II NT    33#/45# 65#    Lemus Pinch NT         3pt Pinch NT            Sensation:   Not formally assessed    12/1/2023 12/1/2023     Left Right   [place] Job       Normal 1.65-2.83 WNL     Diminished Light Touch 3.22-3.61       Diminished Protective 3.84-4.31       Loss of Protective 4.56-6.65       Untestable >6.65             CMS Impairment/Limitation/Restriction for FOTO Hand Survey     Therapist reviewed FOTO scores for Anirudh Stinson on 12/1/2023.   FOTO documents entered into [employer] - see Media section.     Limitation Score: 72%            Treatment      Sriram received the following supervised modalities after being cleared for contradictions for 10 minutes:   Patient tolerates tx of therapeutic fluidotherapy in order to decrease hypersensitivity, decrease pain, and/or increase soft tissue extensibility during AROM.       Sriram received the following manual therapy techniques for 20 minutes:   -I provide gentle STM/RGM to LSF in order to increase soft tissue extensibility, decrease pain, and tenderness/hypersensitivity in the stated area, and promote scar tissue remodeling.  X 10 min  -grade 1 and 2 joint mobs to the PIPJ  -I provide gentle scar mob to decrease scar tenderness, adherence, and hypersensitivity.  X 10 min    Sriram participated in dynamic functional therapeutic activities to improve functional performance for 10 minutes, including:  -Therapist A PROM  - joint blocking   - TGEs in fluido  - dowel fist<>hook   -  resisted finger   - wrist ROM sustained  with large dowel  - towel scrunches  - iso towel squeezes  - red CP and small poms     Home Exercises and Education Provided     Education provided:   - HEP in place  - Progress towards goals     Written Home Exercises Provided: yes.  Exercises were reviewed and Sriram was able to demonstrate them prior to the end of the session.  Sriram demonstrated good  understanding of the HEP provided.   .   See EMR under Patient Instructions for exercises provided prior visit.        Assessment   Patient arrives to tx session about 20 min late so limited tx session performed today. Patient is currently 7w p/o. He states that he has been having some pain in the LSF PIP, but he is showing some mild improvement with ROM.  assessment performed today in which he demos some appropriate p/o weakness. Scar mob with scar extractor providedto decrease scar tenderness, adherence, and hypersensitivity.  Grade 1 and 2 joint mobs to the PIPJ provided to promote ROM gains. Patient tolerates P/AA/AROM exs to per protocol to increase functional and available ROM of LSF. Patient demos increase in ROM post exs.. I initiate ulnar sided pinch and  strengthening today.. These are jeanne well today, no true pain noted following tx session today, but he does note some general fatigue following.. I instruct patient on specific HEP to promote carryover of ROM gains. Patient would benefit from ongoing skilled OT services. Continue POC and progress as tolerated per protocol.      Sriram is progressing well towards his goals and there are no updates to goals at this time. Pt prognosis is Fair.     Pt will continue to benefit from skilled outpatient occupational therapy to address the deficits listed in the problem list on initial evaluation provide pt/family education and to maximize pt's level of independence in the home and community environment.     Anticipated barriers to occupational therapy: everity of  contracture. Therapy and splinting delay. Pt surgery 10/31/23 first visit here on 12/1/23 pt still had steristrips.    Pt's spiritual, cultural and educational needs considered and pt agreeable to plan of care and goals.    Goals:  The following goals were discussed with the patient and patient is in agreement with them as to be addressed in the treatment plan.   Short term Goals:  1) Initiate HEP Met, 12/19/2023  2) Pt will increase AROM of L SF by 5-10 degrees in order to assist with functional carrying moving and handling by 4 weeks. Met, 12/19/2023  3) Pt will reduce edema by .5-1 cm in affected L SF by 4 weeks.  4) Pt will reduce pain to less than 4/10 by 4 weeks.  5) Pt will increase functional  strength by 5# in order to A in opening containers for med management or home management tasks by 4 weeks.   6) Patient will be able to achieve less than or equal to 50% on the FOTO, demonstrating overall improved functional ability with upper extremity. (Self-care category)     Long Term Goals:  Goals to be met by discharge:  1) Independent with HEP  2) Pt will increase L hand MURRAY by 25 degrees in order to increase functional use for grasp with home management or work related tasks by d/c.   3) Pt will decrease edema to trace or none to increase functional ROM by d/c.   4) Pt will decrease pain to trace or none while completing light home management tasks or work related tasks by d/c.   5) Patient will be able to achieve less than or equal to 25% on the FOTO, demonstrating overall improved functional ability with upper extremity.  (Self-care category)    Plan   Continue skilled OT POC and progress per protocol as tolerated.   Updates/Grading for next session: A/PROM, jt blocking, TGEs, scar mob      Babita Lezama, OT

## 2023-12-19 ENCOUNTER — CLINICAL SUPPORT (OUTPATIENT)
Dept: REHABILITATION | Facility: HOSPITAL | Age: 33
End: 2023-12-19
Payer: MEDICAID

## 2023-12-19 DIAGNOSIS — M62.81 MUSCLE WEAKNESS: ICD-10-CM

## 2023-12-19 DIAGNOSIS — M25.60 STIFFNESS IN JOINT: ICD-10-CM

## 2023-12-19 DIAGNOSIS — M25.542 ARTHRALGIA OF LEFT HAND: Primary | ICD-10-CM

## 2023-12-19 PROCEDURE — 97530 THERAPEUTIC ACTIVITIES: CPT | Mod: PN

## 2023-12-21 NOTE — PROGRESS NOTES
"  Occupational Therapy Daily Treatment Note     Name: Anirudh Stinson  Clinic Number: [id number]    Therapy Diagnosis:   Encounter Diagnoses   Name Primary?    Arthralgia of left hand Yes    Muscle weakness          Physician: Abdoul Garcia MD    Visit Date: 12/22/2023     Physician Orders: Eval and Treat  Medical Diagnosis: L SF PIP fracture with pinning  Surgical Procedure and Date: Note states flexion contracture release after P1 fracture, 10/31/23 / Date of Injury: September 23  Evaluation Date: 12/1/2023  Insurance Authorization Period Expiration: 12/31/23  Plan of Care Certification Period: 1/26/23  Date of Return to MD: 1/8/2024  Visit # / Visits authorized: 6 / 20  FOTO: 1/3     Precautions:  Standard, PIP arthrodesis and extensor tendon release protocol.   Time In: 955 am   Time Out: 10:40 am  Total Billable Time: 45 minutes       Subjective     Pt reports: "It's ok I guess.   he was compliant with home exercise program given last session.   Response to previous treatment:decrease in pain; increase in ROM  Functional change: Same     Pain: 3/10  Location: left hand    Objective   Observation/Appearance: Presents with splint already and steristrips still in place.      Special Tests: none     Edema. Measured in centimeters.    12/1/2023 12/1/2023         Left Right       Wrist Crease           MPS 26 22       SF  P1  P2  P3     7.4  7  6.4    6.3  5.8  5             Elbow and Wrist ROM. Measured in degrees.    12/1/2023 12/1/2023         Left Right       Elbow Ext/Flex           Supination/Pronation           Wrist Ext/Flex WNL         Wrist RD/UD WNL            Hand ROM. Measured in degrees.    12/1/2023 12/1/2023 12/6/2023       Left Right Left                  Index: MP  WNL                    PIP                          DIP                       Long:  MP                      PIP                      DIP                       Ring:   MP                      PIP                      DIP                "        Small:  MP -15/48   0/80                 PIP -47/59    -45/65                 DIP 0/0    0/0                                                             Opposition WNL             Strength (Dynamometer) and Pinch Strength (Pinch Gauge)  Measured in pounds.    12/1/2023 12/1/2023 12/19/23 12/19/23      Left Right  Left Right    Rung II NT    33#/45# 65#    Lemus Pinch NT         3pt Pinch NT            Sensation:   Not formally assessed    12/1/2023 12/1/2023     Left Right   [place] Job       Normal 1.65-2.83 WNL     Diminished Light Touch 3.22-3.61       Diminished Protective 3.84-4.31       Loss of Protective 4.56-6.65       Untestable >6.65             CMS Impairment/Limitation/Restriction for FOTO Hand Survey     Therapist reviewed FOTO scores for Anirudh Stinson on 12/1/2023.   FOTO documents entered into [employer] - see Media section.     Limitation Score: 72%            Treatment      Sriram received the following supervised modalities after being cleared for contradictions for 10 minutes:   Patient tolerates tx of therapeutic fluidotherapy in order to decrease hypersensitivity, decrease pain, and/or increase soft tissue extensibility during AROM.       Sriram received the following manual therapy techniques for 20 minutes:   -I provide gentle STM/RGM to LSF in order to increase soft tissue extensibility, decrease pain, and tenderness/hypersensitivity in the stated area, and promote scar tissue remodeling.  X 10 min  -grade 1 and 2 joint mobs to the PIPJ  -I provide gentle scar mob to decrease scar tenderness, adherence, and hypersensitivity.  X 10 min    Sriram participated in dynamic functional therapeutic activities to improve functional performance for 15 minutes, including:  -Therapist A PROM  - joint blocking   - TGEs in fluido  - dowel fist<>hook   - resisted finger   - wrist ROM sustained  with large dowel  - towel scrunches  - iso towel squeezes  - red CP and small poms   -Yellow putty  exercises      Roll   Pinch   Punch outs     Home Exercises and Education Provided     Education provided:   - HEP in place  - Progress towards goals     Written Home Exercises Provided: yes.  Exercises were reviewed and Sriram was able to demonstrate them prior to the end of the session.  Sriram demonstrated good  understanding of the HEP provided.   .   See EMR under Patient Instructions for exercises provided prior visit.        Assessment   Patient did fair today still approx 45 degree flexion contracture of the finger and MP instability due to tightness in the finger. Discussed POC with possible serial casting needed to reduce flexion contracture. He did well with added exercises and focus on stretching and some strengthening to tolerance. He will continue to progress as tolerated.     Sriram is progressing well towards his goals and there are no updates to goals at this time. Pt prognosis is Fair.     Pt will continue to benefit from skilled outpatient occupational therapy to address the deficits listed in the problem list on initial evaluation provide pt/family education and to maximize pt's level of independence in the home and community environment.     Anticipated barriers to occupational therapy: everity of contracture. Therapy and splinting delay. Pt surgery 10/31/23 first visit here on 12/1/23 pt still had steristrips.    Pt's spiritual, cultural and educational needs considered and pt agreeable to plan of care and goals.    Goals:  The following goals were discussed with the patient and patient is in agreement with them as to be addressed in the treatment plan.   Short term Goals:  1) Initiate HEP Met, 12/22/2023  2) Pt will increase AROM of L SF by 5-10 degrees in order to assist with functional carrying moving and handling by 4 weeks. Met, 12/22/2023  3) Pt will reduce edema by .5-1 cm in affected L SF by 4 weeks.  4) Pt will reduce pain to less than 4/10 by 4 weeks.  5) Pt will increase functional   strength by 5# in order to A in opening containers for med management or home management tasks by 4 weeks.   6) Patient will be able to achieve less than or equal to 50% on the FOTO, demonstrating overall improved functional ability with upper extremity. (Self-care category)     Long Term Goals:  Goals to be met by discharge:  1) Independent with HEP  2) Pt will increase L hand MURRAY by 25 degrees in order to increase functional use for grasp with home management or work related tasks by d/c.   3) Pt will decrease edema to trace or none to increase functional ROM by d/c.   4) Pt will decrease pain to trace or none while completing light home management tasks or work related tasks by d/c.   5) Patient will be able to achieve less than or equal to 25% on the FOTO, demonstrating overall improved functional ability with upper extremity.  (Self-care category)    Plan   Continue skilled OT POC and progress per protocol as tolerated.   Updates/Grading for next session: A/PROM, jt blocking, TGEs, scar mob      Flo Triana, OT

## 2023-12-22 ENCOUNTER — CLINICAL SUPPORT (OUTPATIENT)
Dept: REHABILITATION | Facility: HOSPITAL | Age: 33
End: 2023-12-22
Payer: MEDICAID

## 2023-12-22 DIAGNOSIS — M25.542 ARTHRALGIA OF LEFT HAND: Primary | ICD-10-CM

## 2023-12-22 DIAGNOSIS — M62.81 MUSCLE WEAKNESS: ICD-10-CM

## 2023-12-22 PROCEDURE — 97530 THERAPEUTIC ACTIVITIES: CPT | Mod: PN

## 2025-07-27 NOTE — ED TRIAGE NOTES
"Pt c/o burning upon urination with "precum" appearing penile discharge. Denies abdominal pain.  "
Yes